# Patient Record
Sex: FEMALE | Race: WHITE | NOT HISPANIC OR LATINO | Employment: FULL TIME | URBAN - METROPOLITAN AREA
[De-identification: names, ages, dates, MRNs, and addresses within clinical notes are randomized per-mention and may not be internally consistent; named-entity substitution may affect disease eponyms.]

---

## 2017-01-21 DIAGNOSIS — Z30.41 ENCOUNTER FOR SURVEILLANCE OF CONTRACEPTIVE PILLS: ICD-10-CM

## 2017-01-21 RX ORDER — NORETHINDRONE ACETATE AND ETHINYL ESTRADIOL AND FERROUS FUMARATE 1MG-20(21)
KIT ORAL
Qty: 28 TABLET | Refills: 0 | Status: SHIPPED | OUTPATIENT
Start: 2017-01-21 | End: 2017-02-02 | Stop reason: SDUPTHER

## 2017-02-02 ENCOUNTER — OFFICE VISIT (OUTPATIENT)
Dept: OBSTETRICS AND GYNECOLOGY | Facility: CLINIC | Age: 37
End: 2017-02-02
Payer: COMMERCIAL

## 2017-02-02 VITALS
SYSTOLIC BLOOD PRESSURE: 120 MMHG | RESPIRATION RATE: 16 BRPM | WEIGHT: 127 LBS | HEIGHT: 61 IN | BODY MASS INDEX: 23.98 KG/M2 | DIASTOLIC BLOOD PRESSURE: 78 MMHG | HEART RATE: 84 BPM

## 2017-02-02 DIAGNOSIS — Z01.419 WELL WOMAN EXAM WITH ROUTINE GYNECOLOGICAL EXAM: Primary | ICD-10-CM

## 2017-02-02 DIAGNOSIS — Z30.41 ENCOUNTER FOR SURVEILLANCE OF CONTRACEPTIVE PILLS: ICD-10-CM

## 2017-02-02 PROCEDURE — 99999 PR PBB SHADOW E&M-EST. PATIENT-LVL III: CPT | Mod: PBBFAC,,, | Performed by: OBSTETRICS & GYNECOLOGY

## 2017-02-02 PROCEDURE — 99395 PREV VISIT EST AGE 18-39: CPT | Mod: S$GLB,,, | Performed by: OBSTETRICS & GYNECOLOGY

## 2017-02-02 RX ORDER — NORETHINDRONE ACETATE AND ETHINYL ESTRADIOL 1MG-20(21)
1 KIT ORAL DAILY
Qty: 90 TABLET | Refills: 3 | Status: SHIPPED | OUTPATIENT
Start: 2017-02-02 | End: 2018-01-02 | Stop reason: SDUPTHER

## 2017-02-02 NOTE — PROGRESS NOTES
Subjective:    Patient ID: Hannah Martines is a 36 y.o. female.     Chief Complaint: Annual Well Woman Exam     History of Present Illness:  Hannah ROSENTHAL presents today for Annual Well Woman exam. She states her menses are absent on OC's for the past year. She denies flushing or other menopausal symptoms.. She denies pelvic pain. She denies breast tenderness, masses, nipple discharge.  She reports no problems with urination. Bowel movements have not significantly changed. Her current contraceptive method is oral contraceptives (estrogen/progesterone) and she reports no problems.    Menstrual History:   No LMP recorded..     OB History      Para Term  AB TAB SAB Ectopic Multiple Living    2 2 1 1      2            Review of Systems   Constitutional: Negative for activity change, appetite change, chills, diaphoresis, fatigue, fever and unexpected weight change.   HENT: Negative for mouth sores and tinnitus.    Eyes: Negative for discharge and visual disturbance.   Respiratory: Negative for cough, shortness of breath and wheezing.    Cardiovascular: Negative for chest pain, palpitations and leg swelling.   Gastrointestinal: Negative for abdominal pain, blood in stool, constipation, diarrhea, nausea and vomiting.   Endocrine: Negative for diabetes, hair loss, hot flashes, hyperthyroidism and hypothyroidism.   Genitourinary: Negative for decreased libido, dyspareunia, dysuria, flank pain, frequency, genital sores, hematuria, menorrhagia, menstrual problem, pelvic pain, urgency, vaginal bleeding, vaginal discharge, vaginal pain, urinary incontinence, postcoital bleeding and vaginal odor.   Musculoskeletal: Negative for back pain, joint swelling and myalgias.   Skin:  Negative for rash, no acne and hair changes.   Neurological: Negative for seizures, syncope, numbness and headaches.   Hematological: Negative for adenopathy. Does not bruise/bleed easily.   Psychiatric/Behavioral: Negative for sleep disturbance. The  patient is not nervous/anxious.    Breast: Negative for breast pain and nipple discharge        Objective:    Vital Signs:  Vitals:    02/02/17 1432   BP: 120/78   Pulse: 84   Resp: 16       Physical Exam:  General:  alert,normal appearing gravid female   Skin:  Skin color, texture, turgor normal. No rashes or lesions   HEENT:  conjunctivae/corneas clear. PERRL.   Neck: supple, trachea midline, no adenopathy or thyromegally   Respiratory:  clear to auscultation bilaterally   Heart:  regular rate and rhythm, S1, S2 normal, no murmur, click, rub or gallop   Breasts:   Nipples are protruding and have no nipple discharge. No palpable masses, erythema, skin changes, tenderness, or adenopathy.   Abdomen:  soft, non-tender. Bowel sounds normal. No masses,  no organomegaly   Pelvis: External genitalia: normal general appearance  Urinary system: urethral meatus normal, bladder nontender  Vaginal: normal mucosa without prolapse or lesions  Cervix: normal appearance  Uterus: normal single, nontender  Adnexa: normal bimanual exam   Extremities: Normal ROM; no edema, no cyanosis   Neurologial: Normal strength and tone. No focal numbness or weakness. Reflexes 2+ and equal.   Psychiatric: normal mood, speech, dress, and thought processes           Assessment:      1. Well woman exam with routine gynecological exam    2. Encounter for surveillance of contraceptive pills          Plan:      Well woman exam with routine gynecological exam    Encounter for surveillance of contraceptive pills  -     norethindrone-ethinyl estradiol (MICROGESTIN FE 1/20, 28,) 1 mg-20 mcg (21)/75 mg (7) per tablet; Take 1 tablet by mouth once daily.  Dispense: 90 tablet; Refill: 3        COUNSELING:  Hannah ROSENTHAL was counseled on A.C.O.G. Pap guidelines and recommendations for yearly pelvic exams in addition to recommendations for yearly mammograms and monthly self breast exams. In addition she was counseled on adequate intake of calcium and vitamin D; to see  her PCP for other health maintenance

## 2017-02-06 ENCOUNTER — TELEPHONE (OUTPATIENT)
Dept: OBSTETRICS AND GYNECOLOGY | Facility: CLINIC | Age: 37
End: 2017-02-06

## 2017-02-06 NOTE — TELEPHONE ENCOUNTER
Received notice from pt insurance company requesting letter of medical necessity for OCP prescribed (Microgestin FE). This is needed to determine approval of prior authorization. Please advise.

## 2017-02-06 NOTE — TELEPHONE ENCOUNTER
Letter faxed to [facility] department at [phone]. Fax confirmation received.

## 2017-02-07 NOTE — TELEPHONE ENCOUNTER
Completed PA faxed to [facility] at [phone]. Scanned in chart. Fax confirmation recieved.

## 2018-01-02 DIAGNOSIS — Z30.41 ENCOUNTER FOR SURVEILLANCE OF CONTRACEPTIVE PILLS: ICD-10-CM

## 2018-01-02 RX ORDER — NORETHINDRONE ACETATE AND ETHINYL ESTRADIOL AND FERROUS FUMARATE 1MG-20(21)
KIT ORAL
Qty: 84 TABLET | Refills: 0 | Status: SHIPPED | OUTPATIENT
Start: 2018-01-02 | End: 2018-03-22 | Stop reason: SDUPTHER

## 2018-01-02 NOTE — TELEPHONE ENCOUNTER
Hannah ROSENTHAL desire refill of OCP. Annual exam scheduled with Dr. Joshi   Patient Active Problem List   Diagnosis    Polycystic ovarian disease     Prior to Admission medications    Medication Sig Start Date End Date Taking? Authorizing Provider   norethindrone-ethinyl estradiol (MICROGESTIN FE 1/20, 28,) 1 mg-20 mcg (21)/75 mg (7) per tablet Take 1 tablet by mouth once daily. 2/2/17 2/2/18  Bryon Joshi MD

## 2018-01-05 ENCOUNTER — TELEPHONE (OUTPATIENT)
Dept: OBSTETRICS AND GYNECOLOGY | Facility: CLINIC | Age: 38
End: 2018-01-05

## 2018-01-08 NOTE — TELEPHONE ENCOUNTER
PA form signed by MD, faxed to [phone], confirmation received. Awaiting response. Form put to scan.

## 2018-01-15 NOTE — TELEPHONE ENCOUNTER
Denial has been received. Denial is because medication has not been deemed medically necessary. Patient instructed obtain formulary. Annual due 2/18. Patient verbalized understanding with no questions or concerns. Denial scanned to chart.

## 2018-03-22 ENCOUNTER — TELEPHONE (OUTPATIENT)
Dept: OBSTETRICS AND GYNECOLOGY | Facility: CLINIC | Age: 38
End: 2018-03-22

## 2018-03-22 ENCOUNTER — OFFICE VISIT (OUTPATIENT)
Dept: OBSTETRICS AND GYNECOLOGY | Facility: CLINIC | Age: 38
End: 2018-03-22
Payer: COMMERCIAL

## 2018-03-22 VITALS
WEIGHT: 132 LBS | SYSTOLIC BLOOD PRESSURE: 120 MMHG | DIASTOLIC BLOOD PRESSURE: 78 MMHG | HEART RATE: 88 BPM | RESPIRATION RATE: 17 BRPM | BODY MASS INDEX: 24.92 KG/M2 | HEIGHT: 61 IN

## 2018-03-22 DIAGNOSIS — Z30.41 ENCOUNTER FOR SURVEILLANCE OF CONTRACEPTIVE PILLS: ICD-10-CM

## 2018-03-22 DIAGNOSIS — Z12.4 PAP SMEAR FOR CERVICAL CANCER SCREENING: ICD-10-CM

## 2018-03-22 DIAGNOSIS — Z01.419 WELL WOMAN EXAM WITH ROUTINE GYNECOLOGICAL EXAM: Primary | ICD-10-CM

## 2018-03-22 PROCEDURE — 88175 CYTOPATH C/V AUTO FLUID REDO: CPT | Performed by: PATHOLOGY

## 2018-03-22 PROCEDURE — 99395 PREV VISIT EST AGE 18-39: CPT | Mod: S$GLB,,, | Performed by: OBSTETRICS & GYNECOLOGY

## 2018-03-22 PROCEDURE — 88141 CYTOPATH C/V INTERPRET: CPT | Mod: ,,, | Performed by: PATHOLOGY

## 2018-03-22 PROCEDURE — 99999 PR PBB SHADOW E&M-EST. PATIENT-LVL III: CPT | Mod: PBBFAC,,, | Performed by: OBSTETRICS & GYNECOLOGY

## 2018-03-22 RX ORDER — NORETHINDRONE ACETATE AND ETHINYL ESTRADIOL 1MG-20(21)
1 KIT ORAL DAILY
Qty: 90 TABLET | Refills: 3 | Status: SHIPPED | OUTPATIENT
Start: 2018-03-22 | End: 2019-02-24 | Stop reason: SDUPTHER

## 2018-03-22 NOTE — PROGRESS NOTES
Subjective:    Patient ID: Hannah Martines is a 37 y.o. female.     Chief Complaint: Annual Well Woman Exam     History of Present Illness:  Hannah ROSENTHAL presents today for Annual Well Woman exam. She states her menses are regular every month without intermenstrual spotting. She reports that her menstrual flow is light with minimal cramping. She denies pelvic pain. She denies breast tenderness, masses, nipple discharge.  She reports no problems with urination. Bowel movements have not significantly changed. Her current contraceptive method is oral contraceptives (estrogen/progesterone) and she reports no problems.    Menstrual History:   No LMP recorded..     OB History      Para Term  AB Living    2 2 1 1   2    SAB TAB Ectopic Multiple Live Births            2            Review of Systems   Constitutional: Negative for activity change, appetite change, chills, diaphoresis, fatigue, fever and unexpected weight change.   HENT: Negative for mouth sores and tinnitus.    Eyes: Negative for discharge and visual disturbance.   Respiratory: Negative for cough, shortness of breath and wheezing.    Cardiovascular: Negative for chest pain, palpitations and leg swelling.   Gastrointestinal: Negative for abdominal pain, blood in stool, constipation, diarrhea, nausea and vomiting.   Endocrine: Negative for diabetes, hair loss, hot flashes, hyperthyroidism and hypothyroidism.   Genitourinary: Negative for decreased libido, dyspareunia, dysuria, flank pain, frequency, genital sores, hematuria, menorrhagia, menstrual problem, pelvic pain, urgency, vaginal bleeding, vaginal discharge, vaginal pain, urinary incontinence, postcoital bleeding and vaginal odor.   Musculoskeletal: Negative for back pain, joint swelling and myalgias.   Skin:  Negative for rash, no acne and hair changes.   Neurological: Negative for seizures, syncope, numbness and headaches.   Hematological: Negative for adenopathy. Does not bruise/bleed easily.    Psychiatric/Behavioral: Negative for sleep disturbance. The patient is not nervous/anxious.    Breast: Negative for breast pain and nipple discharge        Objective:    Vital Signs:  Vitals:    03/22/18 1533   BP: 120/78   Pulse: 88   Resp: 17       Physical Exam:  General:  alert,normal appearing gravid female   Skin:  Skin color, texture, turgor normal. No rashes or lesions   HEENT:  conjunctivae/corneas clear. PERRL.   Neck: supple, trachea midline, no adenopathy or thyromegally   Respiratory:  clear to auscultation bilaterally   Heart:  regular rate and rhythm, S1, S2 normal, no murmur, click, rub or gallop   Breasts:   Nipples are protruding and have no nipple discharge. No palpable masses, erythema, skin changes, tenderness, or adenopathy.   Abdomen:  soft, non-tender. Bowel sounds normal. No masses,  no organomegaly   Pelvis: External genitalia: normal general appearance  Urinary system: urethral meatus normal, bladder nontender  Vaginal: normal mucosa without prolapse or lesions  Cervix: normal appearance  Uterus: normal single, nontender  Adnexa: normal bimanual exam   Extremities: Normal ROM; no edema, no cyanosis   Neurologial: Normal strength and tone. No focal numbness or weakness. Reflexes 2+ and equal.   Psychiatric: normal mood, speech, dress, and thought processes           Assessment:      1. Well woman exam with routine gynecological exam    2. Pap smear for cervical cancer screening    3. Encounter for surveillance of contraceptive pills          Plan:      Well woman exam with routine gynecological exam    Pap smear for cervical cancer screening  -     Liquid-based pap smear, screening    Encounter for surveillance of contraceptive pills  -     norethindrone-ethinyl estradiol (MICROGESTIN FE 1/20, 28,) 1 mg-20 mcg (21)/75 mg (7) per tablet; Take 1 tablet by mouth once daily.  Dispense: 90 tablet; Refill: 3        COUNSELING:  Hannah ROSENTHAL was counseled on A.C.O.G. Pap guidelines and recommendations  for yearly pelvic exams in addition to recommendations for yearly mammograms and monthly self breast exams. In addition she was counseled on adequate intake of calcium and vitamin D; to see her PCP for other health maintenance

## 2018-03-23 NOTE — TELEPHONE ENCOUNTER
"Received denial letter on PA.    Pt needs a letter of "medical necessity" on file with her Group plan.      I have called [facility] ([phone]), [facility] ([phone]), and [facility] ([phone]) to find out how and who to submit the letter to, and no one could help.      I have spoken with pt, and she is aware of the denial, stated if she is able to find out the information needed she will contact our office. Pt stated for now she will continue to pay cash for BC.  "

## 2019-02-24 DIAGNOSIS — Z30.41 ENCOUNTER FOR SURVEILLANCE OF CONTRACEPTIVE PILLS: ICD-10-CM

## 2019-02-25 RX ORDER — NORETHINDRONE ACETATE AND ETHINYL ESTRADIOL AND FERROUS FUMARATE 1MG-20(21)
KIT ORAL
Qty: 84 TABLET | Refills: 0 | Status: SHIPPED | OUTPATIENT
Start: 2019-02-25

## 2019-02-25 NOTE — TELEPHONE ENCOUNTER
Hannah ROSENTHAL desire refill of OCP. Last annual 3/2018. Please advise.   Patient Active Problem List   Diagnosis    Polycystic ovarian disease     Prior to Admission medications    Medication Sig Start Date End Date Taking? Authorizing Provider   norethindrone-ethinyl estradiol (MICROGESTIN FE 1/20, 28,) 1 mg-20 mcg (21)/75 mg (7) per tablet Take 1 tablet by mouth once daily. 3/22/18 3/22/19  Bryon Joshi MD